# Patient Record
Sex: FEMALE | Race: WHITE | NOT HISPANIC OR LATINO
[De-identification: names, ages, dates, MRNs, and addresses within clinical notes are randomized per-mention and may not be internally consistent; named-entity substitution may affect disease eponyms.]

---

## 2022-03-07 ENCOUNTER — NON-APPOINTMENT (OUTPATIENT)
Age: 27
End: 2022-03-07

## 2022-03-10 PROBLEM — Z00.00 ENCOUNTER FOR PREVENTIVE HEALTH EXAMINATION: Status: ACTIVE | Noted: 2022-03-10

## 2022-03-11 ENCOUNTER — NON-APPOINTMENT (OUTPATIENT)
Age: 27
End: 2022-03-11

## 2022-03-11 ENCOUNTER — TRANSCRIPTION ENCOUNTER (OUTPATIENT)
Age: 27
End: 2022-03-11

## 2022-03-11 ENCOUNTER — APPOINTMENT (OUTPATIENT)
Dept: OTOLARYNGOLOGY | Facility: CLINIC | Age: 27
End: 2022-03-11
Payer: COMMERCIAL

## 2022-03-11 VITALS — TEMPERATURE: 97.7 F | BODY MASS INDEX: 31.01 KG/M2 | HEIGHT: 63 IN | WEIGHT: 175 LBS

## 2022-03-11 DIAGNOSIS — K11.5 SIALOLITHIASIS: ICD-10-CM

## 2022-03-11 PROCEDURE — 99204 OFFICE O/P NEW MOD 45 MIN: CPT

## 2022-03-11 NOTE — ASSESSMENT
[FreeTextEntry1] : 25 y/o F who presents with left parotid sialoadenitis, on exam some scarring seen at the left parotid, punctum hard to identify. \par pt s/p many courses of abx without any durable relief \par - Will proceed with regiment to increase salivary flow to reduce pooling in the gland and washout any possible obstruction if possible. Recommended hydration, regular massage, sour candies, and warm compress\par - Discussed options for recurrent parotid sialoadenitis- observation with conservative management versus interventional sialoendoscopy vs excision of gland. \par Discussed details of the regiment/procedures and risks of each including but not limited to:\par interventional sialoendoscopy - bleeding, infection, scarring, inability to remove stone, ductal perforation/avulsion, injury to surrounding nerves, seroma, persistent or worsening of sx.\par Combined approach - increased risk of bleeding and infections injury to surrounding nerves including facial nerved and sensory nerves. \par complete gland removal - external scar, injury to facial nerve etc. \par pt elected to start with office dilation, can stage if does not improve and do the sialendoscopy . \par

## 2022-03-11 NOTE — END OF VISIT
[FreeTextEntry3] : I personally saw and examined Elysia Hemphill in detail. I spoke to LILIYA Gilliland regarding the assessment and plan of care.  I preformed the procedures and I reviewed the above assessment and plan of care, and agree. I have made changes in changes in the body of the note where appropriate.\par \par

## 2022-03-11 NOTE — REASON FOR VISIT
[Initial Consultation] : an initial consultation for [FreeTextEntry2] : recurring gland infection / parotid

## 2022-03-11 NOTE — HISTORY OF PRESENT ILLNESS
[de-identified] : 25 y/o F c/o left parotid gland sialoadenitis x 2019, went to the hospital was given abx and steroids with relief. Now with reoccurring parotid gland infections every 9 months. Pt describes the episodes with pain in the gland and swelling will follow. Pt states will occ get fever, and very fatigued. Not really with obstructive sxs where shes eating and it will swell up. \par pt admits to about 4 infections since 2019. \par Pt will usually get clindamycin for this with relief. Pt was referred by Dr. Cardoza ENT for possible procedure. \par pt was worked up for any autoimmune disorders- WNL. \par CT done at [facility]- no stones or abscess

## 2022-03-11 NOTE — PHYSICAL EXAM
[Normal] : mucosa is normal [Midline] : trachea located in midline position [de-identified] : +scarring left parotid

## 2022-03-11 NOTE — REVIEW OF SYSTEMS
[Throat Pain] : throat pain [Negative] : Heme/Lymph [de-identified] : enlarged tonsils neil when sick

## 2022-03-11 NOTE — CONSULT LETTER
[Please see my note below.] : Please see my note below. [FreeTextEntry1] : Dear Dr. SUZANNE DUONG \par I had the pleasure of evaluating your patient Elysia Hemphill, thank you for allowing us to participate in their care. please see full note detailing our visit below.\par If you have any questions, please do not hesitate to call me and I would be happy to discuss further. \par \par Kris Rebolledo M.D.\par Attending Physician,  \par Department of Otolaryngology - Head and Neck Surgery\par [facility] \par Office: [phone]\par Fax: [phone]\par \par

## 2022-03-24 ENCOUNTER — TRANSCRIPTION ENCOUNTER (OUTPATIENT)
Age: 27
End: 2022-03-24

## 2022-03-25 ENCOUNTER — TRANSCRIPTION ENCOUNTER (OUTPATIENT)
Age: 27
End: 2022-03-25

## 2022-03-28 ENCOUNTER — TRANSCRIPTION ENCOUNTER (OUTPATIENT)
Age: 27
End: 2022-03-28

## 2022-12-23 ENCOUNTER — APPOINTMENT (OUTPATIENT)
Dept: OTOLARYNGOLOGY | Facility: CLINIC | Age: 27
End: 2022-12-23

## 2023-09-07 NOTE — HISTORY OF PRESENT ILLNESS
[Improved Health] : Improved health [Quality of Life] : Quality of life [Improved Looks/Aesthetics] : Improved looks/aesthetics [Improve Mood] : Improved mood [Home] : at home, [unfilled] , at the time of the visit. [Medical Office: ([facility])___] : at the medical office located in  [Verbal consent obtained from patient] : the patient, [unfilled] [FreeTextEntry1] : Patient is a 27-year-old female who presents for initial obesity medicine consultation.

## 2023-09-13 DIAGNOSIS — R12 HEARTBURN: ICD-10-CM

## 2023-09-13 DIAGNOSIS — Z78.9 OTHER SPECIFIED HEALTH STATUS: ICD-10-CM

## 2023-09-13 DIAGNOSIS — Z82.0 FAMILY HISTORY OF EPILEPSY AND OTHER DISEASES OF THE NERVOUS SYSTEM: ICD-10-CM

## 2023-09-13 DIAGNOSIS — Z82.5 FAMILY HISTORY OF ASTHMA AND OTHER CHRONIC LOWER RESPIRATORY DISEASES: ICD-10-CM

## 2023-09-13 DIAGNOSIS — Z83.3 FAMILY HISTORY OF DIABETES MELLITUS: ICD-10-CM

## 2023-09-13 DIAGNOSIS — Z82.49 FAMILY HISTORY OF ISCHEMIC HEART DISEASE AND OTHER DISEASES OF THE CIRCULATORY SYSTEM: ICD-10-CM

## 2023-09-13 RX ORDER — DROSPIRENONE AND ETHINYL ESTRADIOL 0.03MG-3MG
KIT ORAL
Refills: 0 | Status: ACTIVE | COMMUNITY

## 2023-09-13 RX ORDER — ASCORBIC ACID 500 MG
TABLET ORAL
Refills: 0 | Status: ACTIVE | COMMUNITY

## 2023-09-13 RX ORDER — CHROMIUM 200 MCG
TABLET ORAL
Refills: 0 | Status: ACTIVE | COMMUNITY

## 2023-09-13 RX ORDER — MULTIVITAMIN
CAPSULE ORAL
Refills: 0 | Status: ACTIVE | COMMUNITY

## 2023-09-14 ENCOUNTER — APPOINTMENT (OUTPATIENT)
Dept: SURGERY | Facility: CLINIC | Age: 28
End: 2023-09-14
Payer: COMMERCIAL

## 2023-09-14 VITALS — BODY MASS INDEX: 29.98 KG/M2 | WEIGHT: 165 LBS | HEIGHT: 62.25 IN

## 2023-09-14 DIAGNOSIS — E66.9 OBESITY, UNSPECIFIED: ICD-10-CM

## 2023-09-14 PROCEDURE — 99204 OFFICE O/P NEW MOD 45 MIN: CPT | Mod: 95

## 2023-11-16 ENCOUNTER — APPOINTMENT (OUTPATIENT)
Dept: SURGERY | Facility: CLINIC | Age: 28
End: 2023-11-16
Payer: COMMERCIAL

## 2023-11-16 VITALS — BODY MASS INDEX: 28.71 KG/M2 | WEIGHT: 158 LBS | HEIGHT: 62.25 IN

## 2023-11-16 PROCEDURE — 99213 OFFICE O/P EST LOW 20 MIN: CPT | Mod: 95

## 2023-11-16 RX ORDER — PHENTERMINE HYDROCHLORIDE 37.5 MG/1
37.5 CAPSULE ORAL
Qty: 30 | Refills: 1 | Status: ACTIVE | COMMUNITY
Start: 2023-09-14 | End: 1900-01-01

## 2024-04-09 ENCOUNTER — APPOINTMENT (OUTPATIENT)
Dept: SURGERY | Facility: CLINIC | Age: 29
End: 2024-04-09
Payer: COMMERCIAL

## 2024-04-09 VITALS — WEIGHT: 165 LBS | BODY MASS INDEX: 29.6 KG/M2 | HEIGHT: 62.5 IN

## 2024-04-09 PROCEDURE — 99213 OFFICE O/P EST LOW 20 MIN: CPT | Mod: 95

## 2024-04-09 RX ORDER — PHENTERMINE HYDROCHLORIDE 37.5 MG/1
37.5 TABLET ORAL
Qty: 30 | Refills: 1 | Status: ACTIVE | COMMUNITY
Start: 2024-04-09 | End: 1900-01-01

## 2024-04-09 NOTE — HISTORY OF PRESENT ILLNESS
Paged on call regarding PT having diarrhea after taking new medication  [Home] : at home, [unfilled] , at the time of the visit. [Medical Office: ([facility])___] : at the medical office located in  [Verbal consent obtained from patient] : the patient, [unfilled] [FreeTextEntry1] : Patient is a 27-year-old female who presented for initial obesity medicine consultation in September. Patient has a current BMI of 29.94 with a weight of 165 pounds. Of note patient has lost a recent 15 pounds on her own but has plateaued. She exercises regularly and is watching her diet and calories however her weight has not changed. Patient was started on phentermine and presents for follow-up.  last seen 11/2023  Patient was down 15 pounds while taking phentermine however has discontinued phentermine over the last month.  She has unfortunately gained 10 pounds back of her weight loss.  She feels frustrated as she is able to lose the weight but unable to maintain her weight loss.  We discussed several different obesity medicine options at this visit such as switching to GLP-1 injections versus restarting phentermine.  Due to the shortages of the injectable medications at this time, I suggested that patient restart her phentermine as she did well and tolerated this medicine well.  I also discussed with patient that she should incorporate seeing Fernanda López our registered dietitian to go over a structured meal plan that we will then help the patient maintain her weight loss.  Patient agrees with this plan and will see me in 2 months for follow-up.

## 2024-04-09 NOTE — ASSESSMENT
[FreeTextEntry1] : In summary patient is a pleasant 28-year-old female that presents for obesity medicine follow-up.  She has been tolerating phentermine over the last 2 months however has discontinued 1 month ago.  She had lost 15 pounds with phentermine and was happy however once discontinued the medication, she regained 10 pounds.  She would like to restart phentermine however would like to discuss maintaining her weight loss when she reaches her goal weight again.  We discussed incorporating increasing physical activity on a regular basis.  I will also have patient see Fernanda Sanchez registered dietitian to go over a meal plan for the patient that would likely help her maintain her weight loss once this is achieved again.  Patient will see me in 2 months for follow-up and agrees with current plan.

## 2024-04-22 ENCOUNTER — APPOINTMENT (OUTPATIENT)
Dept: SURGERY | Facility: CLINIC | Age: 29
End: 2024-04-22
Payer: COMMERCIAL

## 2024-04-22 PROCEDURE — 97802 MEDICAL NUTRITION INDIV IN: CPT | Mod: 95

## 2024-06-04 NOTE — HISTORY OF PRESENT ILLNESS
[Home] : at home, [unfilled] , at the time of the visit. [Medical Office: ([facility])___] : at the medical office located in  [Verbal consent obtained from patient] : the patient, [unfilled] [FreeTextEntry1] : Patient is a 28-year-old female who presented for initial obesity medicine consultation in September. Patient has a current BMI of 29.94 with a weight of 165 pounds. Of note patient has lost a recent 15 pounds on her own but has plateaued. She exercises regularly and is watching her diet and calories however her weight has not changed. Patient was started on phentermine and presents for follow-up.

## 2024-06-11 ENCOUNTER — APPOINTMENT (OUTPATIENT)
Dept: SURGERY | Facility: CLINIC | Age: 29
End: 2024-06-11